# Patient Record
Sex: MALE | Race: WHITE
[De-identification: names, ages, dates, MRNs, and addresses within clinical notes are randomized per-mention and may not be internally consistent; named-entity substitution may affect disease eponyms.]

---

## 2022-04-11 ENCOUNTER — HOSPITAL ENCOUNTER (EMERGENCY)
Dept: HOSPITAL 4 - SED | Age: 57
Discharge: HOME | End: 2022-04-11
Payer: COMMERCIAL

## 2022-04-11 VITALS — HEIGHT: 70 IN | BODY MASS INDEX: 21.47 KG/M2 | WEIGHT: 150 LBS | SYSTOLIC BLOOD PRESSURE: 138 MMHG

## 2022-04-11 VITALS — SYSTOLIC BLOOD PRESSURE: 148 MMHG

## 2022-04-11 DIAGNOSIS — C71.9: ICD-10-CM

## 2022-04-11 DIAGNOSIS — Z20.822: ICD-10-CM

## 2022-04-11 DIAGNOSIS — G40.909: Primary | ICD-10-CM

## 2022-04-11 LAB
ALBUMIN SERPL BCP-MCNC: 3.7 G/DL (ref 3.4–4.8)
ALT SERPL W P-5'-P-CCNC: 94 U/L (ref 12–78)
ANION GAP SERPL CALCULATED.3IONS-SCNC: 10 MMOL/L (ref 5–15)
AST SERPL W P-5'-P-CCNC: 23 U/L (ref 10–37)
BASOPHILS # BLD AUTO: 0 K/UL (ref 0–0.2)
BASOPHILS NFR BLD AUTO: 0.2 % (ref 0–2)
BILIRUB SERPL-MCNC: 0.3 MG/DL (ref 0–1)
BUN SERPL-MCNC: 34 MG/DL (ref 8–21)
CALCIUM SERPL-MCNC: 8.8 MG/DL (ref 8.4–11)
CHLORIDE SERPL-SCNC: 101 MMOL/L (ref 98–107)
CREAT SERPL-MCNC: 1.32 MG/DL (ref 0.55–1.3)
EOSINOPHIL # BLD AUTO: 0 K/UL (ref 0–0.4)
EOSINOPHIL NFR BLD AUTO: 0 % (ref 0–4)
ERYTHROCYTE [DISTWIDTH] IN BLOOD BY AUTOMATED COUNT: 18.9 % (ref 9–15)
GFR SERPL CREATININE-BSD FRML MDRD: 71 ML/MIN (ref 90–?)
GLUCOSE SERPL-MCNC: 115 MG/DL (ref 70–99)
HCT VFR BLD AUTO: 36.8 % (ref 36–54)
HGB BLD-MCNC: 12 G/DL (ref 14–18)
LYMPHOCYTES # BLD AUTO: 0.3 K/UL (ref 1–5.5)
LYMPHOCYTES NFR BLD AUTO: 5.1 % (ref 20.5–51.5)
MCH RBC QN AUTO: 28 PG (ref 27–31)
MCHC RBC AUTO-ENTMCNC: 33 % (ref 32–36)
MCV RBC AUTO: 86 FL (ref 79–98)
MONOCYTES # BLD MANUAL: 0.4 K/UL (ref 0–1)
MONOCYTES # BLD MANUAL: 6.4 % (ref 1.7–9.3)
NEUTROPHILS # BLD AUTO: 5.4 K/UL (ref 1.8–7.7)
NEUTROPHILS NFR BLD AUTO: 88.3 % (ref 40–70)
PLATELET # BLD AUTO: 338 K/UL (ref 130–430)
POTASSIUM SERPL-SCNC: 4.5 MMOL/L (ref 3.5–5.1)
RBC # BLD AUTO: 4.29 MIL/UL (ref 4.2–6.2)
SODIUM SERPLBLD-SCNC: 138 MMOL/L (ref 136–145)
WBC # BLD AUTO: 6.1 K/UL (ref 4.8–10.8)

## 2022-04-11 PROCEDURE — 96365 THER/PROPH/DIAG IV INF INIT: CPT

## 2022-04-11 PROCEDURE — 76376 3D RENDER W/INTRP POSTPROCES: CPT

## 2022-04-11 PROCEDURE — 70450 CT HEAD/BRAIN W/O DYE: CPT

## 2022-04-11 PROCEDURE — 99284 EMERGENCY DEPT VISIT MOD MDM: CPT

## 2022-04-11 PROCEDURE — 87426 SARSCOV CORONAVIRUS AG IA: CPT

## 2022-04-11 PROCEDURE — 36415 COLL VENOUS BLD VENIPUNCTURE: CPT

## 2022-04-11 PROCEDURE — 80053 COMPREHEN METABOLIC PANEL: CPT

## 2022-04-11 PROCEDURE — 85025 COMPLETE CBC W/AUTO DIFF WBC: CPT

## 2022-09-09 ENCOUNTER — HOSPITAL ENCOUNTER (INPATIENT)
Dept: HOSPITAL 4 - SED | Age: 57
LOS: 2 days | Discharge: ANOTHER HOSPITAL | DRG: 54 | End: 2022-09-11
Attending: FAMILY MEDICINE | Admitting: FAMILY MEDICINE
Payer: COMMERCIAL

## 2022-09-09 VITALS — BODY MASS INDEX: 19.28 KG/M2 | HEIGHT: 69 IN | WEIGHT: 130.13 LBS

## 2022-09-09 VITALS — SYSTOLIC BLOOD PRESSURE: 162 MMHG

## 2022-09-09 DIAGNOSIS — Z79.899: ICD-10-CM

## 2022-09-09 DIAGNOSIS — D75.839: ICD-10-CM

## 2022-09-09 DIAGNOSIS — I69.354: ICD-10-CM

## 2022-09-09 DIAGNOSIS — C71.9: Primary | ICD-10-CM

## 2022-09-09 DIAGNOSIS — K21.9: ICD-10-CM

## 2022-09-09 DIAGNOSIS — R73.9: ICD-10-CM

## 2022-09-09 DIAGNOSIS — E88.09: ICD-10-CM

## 2022-09-09 DIAGNOSIS — G93.6: ICD-10-CM

## 2022-09-09 DIAGNOSIS — Z92.21: ICD-10-CM

## 2022-09-09 DIAGNOSIS — N50.89: ICD-10-CM

## 2022-09-09 DIAGNOSIS — I10: ICD-10-CM

## 2022-09-09 DIAGNOSIS — Z20.822: ICD-10-CM

## 2022-09-09 DIAGNOSIS — D64.9: ICD-10-CM

## 2022-09-09 DIAGNOSIS — Z85.841: ICD-10-CM

## 2022-09-09 DIAGNOSIS — G40.909: ICD-10-CM

## 2022-09-09 LAB
ALBUMIN SERPL BCP-MCNC: 3.2 G/DL (ref 3.4–4.8)
ALT SERPL W P-5'-P-CCNC: 12 U/L (ref 12–78)
ANION GAP SERPL CALCULATED.3IONS-SCNC: 9 MMOL/L (ref 5–15)
AST SERPL W P-5'-P-CCNC: 8 U/L (ref 10–37)
BASOPHILS # BLD AUTO: 0 K/UL (ref 0–0.2)
BASOPHILS NFR BLD AUTO: 0.3 % (ref 0–2)
BILIRUB SERPL-MCNC: 0.3 MG/DL (ref 0–1)
BUN SERPL-MCNC: 19 MG/DL (ref 8–21)
CALCIUM SERPL-MCNC: 9.7 MG/DL (ref 8.4–11)
CHLORIDE SERPL-SCNC: 100 MMOL/L (ref 98–107)
CREAT SERPL-MCNC: 0.95 MG/DL (ref 0.55–1.3)
EOSINOPHIL # BLD AUTO: 0.1 K/UL (ref 0–0.4)
EOSINOPHIL NFR BLD AUTO: 0.6 % (ref 0–4)
ERYTHROCYTE [DISTWIDTH] IN BLOOD BY AUTOMATED COUNT: 17 % (ref 9–15)
GFR SERPL CREATININE-BSD FRML MDRD: 104 ML/MIN (ref 90–?)
GLUCOSE SERPL-MCNC: 103 MG/DL (ref 70–99)
HCT VFR BLD AUTO: 30.9 % (ref 36–54)
HGB BLD-MCNC: 10.3 G/DL (ref 14–18)
LYMPHOCYTES # BLD AUTO: 0.5 K/UL (ref 1–5.5)
LYMPHOCYTES NFR BLD AUTO: 5.8 % (ref 20.5–51.5)
MCH RBC QN AUTO: 27 PG (ref 27–31)
MCHC RBC AUTO-ENTMCNC: 33 % (ref 32–36)
MCV RBC AUTO: 82 FL (ref 79–98)
MONOCYTES # BLD MANUAL: 0.9 K/UL (ref 0–1)
MONOCYTES # BLD MANUAL: 9.9 % (ref 1.7–9.3)
NEUTROPHILS # BLD AUTO: 7.4 K/UL (ref 1.8–7.7)
NEUTROPHILS NFR BLD AUTO: 83.4 % (ref 40–70)
PLATELET # BLD AUTO: 614 K/UL (ref 130–430)
POTASSIUM SERPL-SCNC: 3.7 MMOL/L (ref 3.5–5.1)
RBC # BLD AUTO: 3.79 MIL/UL (ref 4.2–6.2)
SODIUM SERPLBLD-SCNC: 139 MMOL/L (ref 136–145)
WBC # BLD AUTO: 8.8 K/UL (ref 4.8–10.8)

## 2022-09-09 RX ADMIN — DEXTROSE AND SODIUM CHLORIDE SCH MLS/HR: 5; 450 INJECTION, SOLUTION INTRAVENOUS at 21:21

## 2022-09-09 NOTE — NUR
BIBA WITH C/C OF GENERALIZED WEAKNESS X 2 DAYS. HX OF BRAIN CA, CURRENTLY ON 
CHEMO. PT IS POOR HISTORIAN. MED HX OF CA, HTN, GERD. AAO X 2-3. DOES NOT KNOW 
DATE OR TIME. PT REPORTS LAST CHEMO TREATMENT 10 DAYS AGO. PT ON YI WITH 
BLS CREW IN MAIN ED.

## 2022-09-09 NOTE — NUR
Recieved pt resting in bed alert and oriented x 4.has the fiance by bedside, 
vitals done are within normal limit.

## 2022-09-09 NOTE — NUR
Patient will be admitted to care of [facility].MILAN  Admitted to ICU unit.  Will 
go to room .  Belongings list completed.  Complete and up to date summary 
report printed. SBAR report to be given at bedside with opportunity for 
questions.

## 2022-09-09 NOTE — NUR
Placed in room 3  . Placed on cardiac monitor, blood pressure machine and pulse 
oximeter. To gown for exam. Side rails up.

Report given to ANNE-MARIE AMAARL.

## 2022-09-10 VITALS — SYSTOLIC BLOOD PRESSURE: 132 MMHG

## 2022-09-10 VITALS — SYSTOLIC BLOOD PRESSURE: 128 MMHG

## 2022-09-10 VITALS — SYSTOLIC BLOOD PRESSURE: 115 MMHG

## 2022-09-10 VITALS — SYSTOLIC BLOOD PRESSURE: 150 MMHG

## 2022-09-10 VITALS — SYSTOLIC BLOOD PRESSURE: 123 MMHG

## 2022-09-10 VITALS — SYSTOLIC BLOOD PRESSURE: 130 MMHG

## 2022-09-10 VITALS — SYSTOLIC BLOOD PRESSURE: 135 MMHG

## 2022-09-10 VITALS — SYSTOLIC BLOOD PRESSURE: 147 MMHG

## 2022-09-10 VITALS — SYSTOLIC BLOOD PRESSURE: 124 MMHG

## 2022-09-10 VITALS — SYSTOLIC BLOOD PRESSURE: 153 MMHG

## 2022-09-10 VITALS — SYSTOLIC BLOOD PRESSURE: 126 MMHG

## 2022-09-10 VITALS — SYSTOLIC BLOOD PRESSURE: 134 MMHG

## 2022-09-10 VITALS — SYSTOLIC BLOOD PRESSURE: 129 MMHG

## 2022-09-10 VITALS — SYSTOLIC BLOOD PRESSURE: 142 MMHG

## 2022-09-10 VITALS — SYSTOLIC BLOOD PRESSURE: 113 MMHG

## 2022-09-10 VITALS — SYSTOLIC BLOOD PRESSURE: 116 MMHG

## 2022-09-10 VITALS — SYSTOLIC BLOOD PRESSURE: 102 MMHG

## 2022-09-10 VITALS — SYSTOLIC BLOOD PRESSURE: 110 MMHG

## 2022-09-10 LAB
ALBUMIN SERPL BCP-MCNC: 2.7 G/DL (ref 3.4–4.8)
ALT SERPL W P-5'-P-CCNC: 15 U/L (ref 12–78)
ANION GAP SERPL CALCULATED.3IONS-SCNC: 8 MMOL/L (ref 5–15)
AST SERPL W P-5'-P-CCNC: 12 U/L (ref 10–37)
BASOPHILS # BLD AUTO: 0 K/UL (ref 0–0.2)
BASOPHILS NFR BLD AUTO: 0.2 % (ref 0–2)
BILIRUB SERPL-MCNC: 0.3 MG/DL (ref 0–1)
BUN SERPL-MCNC: 20 MG/DL (ref 8–21)
CALCIUM SERPL-MCNC: 9.1 MG/DL (ref 8.4–11)
CHLORIDE SERPL-SCNC: 102 MMOL/L (ref 98–107)
CREAT SERPL-MCNC: 0.94 MG/DL (ref 0.55–1.3)
EOSINOPHIL # BLD AUTO: 0 K/UL (ref 0–0.4)
EOSINOPHIL NFR BLD AUTO: 0 % (ref 0–4)
ERYTHROCYTE [DISTWIDTH] IN BLOOD BY AUTOMATED COUNT: 16.7 % (ref 9–15)
GFR SERPL CREATININE-BSD FRML MDRD: 106 ML/MIN (ref 90–?)
GLUCOSE SERPL-MCNC: 151 MG/DL (ref 70–99)
HCT VFR BLD AUTO: 28.1 % (ref 36–54)
LYMPHOCYTES # BLD AUTO: 0.3 K/UL (ref 1–5.5)
LYMPHOCYTES NFR BLD AUTO: 5.5 % (ref 20.5–51.5)
MCV RBC AUTO: 81 FL (ref 79–98)
MONOCYTES # BLD MANUAL: 0.4 K/UL (ref 0–1)
MONOCYTES # BLD MANUAL: 6.8 % (ref 1.7–9.3)
NEUTROPHILS # BLD AUTO: 5.5 K/UL (ref 1.8–7.7)
NEUTROPHILS NFR BLD AUTO: 87.5 % (ref 40–70)
PLATELET # BLD AUTO: 525 K/UL (ref 130–430)
POTASSIUM SERPL-SCNC: 4 MMOL/L (ref 3.5–5.1)
RBC # BLD AUTO: 3.49 MIL/UL (ref 4.2–6.2)
SODIUM SERPLBLD-SCNC: 138 MMOL/L (ref 136–145)
WBC # BLD AUTO: 6.3 K/UL (ref 4.8–10.8)

## 2022-09-10 RX ADMIN — DEXAMETHASONE SODIUM PHOSPHATE SCH MG: 4 INJECTION, SOLUTION INTRAMUSCULAR; INTRAVENOUS at 12:52

## 2022-09-10 RX ADMIN — DEXAMETHASONE SODIUM PHOSPHATE SCH MG: 4 INJECTION, SOLUTION INTRAMUSCULAR; INTRAVENOUS at 23:59

## 2022-09-10 RX ADMIN — DEXAMETHASONE SODIUM PHOSPHATE SCH MG: 4 INJECTION, SOLUTION INTRAMUSCULAR; INTRAVENOUS at 17:51

## 2022-09-10 RX ADMIN — DEXTROSE AND SODIUM CHLORIDE SCH MLS/HR: 5; 450 INJECTION, SOLUTION INTRAVENOUS at 17:51

## 2022-09-10 RX ADMIN — DEXTROSE AND SODIUM CHLORIDE SCH MLS/HR: 5; 450 INJECTION, SOLUTION INTRAVENOUS at 02:33

## 2022-09-10 NOTE — NUR
Report received from night shift RN for continuity of care. Patient in stable condition. No 
distress noted. Patient resting. Vital signs stable.

## 2022-09-10 NOTE — NUR
RECEIVED REPORT FROM JACKIE XIE, PATIENT ALERT AND ORIENTED SITTING UP IN BED TALKING WITH 
HIS THREE CHILDREN.  VITAL SIGNS STABLE, NO COMPLAINTS OF PAIN OR DISCOMFORT AT THIS TIME.  
WILL CONTINUE TO MONITOR AND REPORT CHANGES AS NEEDED.  EMMA RN.

## 2022-09-10 NOTE — NUR
Dr. Reyes made rounds to see patient. Patient requested food and drink. New orders noted 
and carried out.

## 2022-09-10 NOTE — NUR
OPENING NOTES:

SILVIA FROM ER BROUGHT PATIENT TO ROOM 127. PATIENT IS CONFUSED AND UNABLE TO GIVE NAME OR 
DATE OF BIRTH. PATIENT IS ON REGULAR DIET, ABLE TO EAT AND DRINK BY HIMSELF WITH ASSISTANCE 
ONLY TO OPEN ITEMS. ON ROOM AIR STATING %, NO SKIN ISSUES, LEFT SIDED PARALYSIS FROM A 
STROKE SEVERAL YEARS AGO. PATIENT IS ABLE TO URINATE ON HIS ON IN A URINAL WITH ASSISTANCE. 
BRAKES ARE LOCKED, OXYGEN AND SUCTION WORKING, BED AT THE LOWEST LEVEL, 3 SIDE RAILS UP, AND 
CALL LIGHT WITHIN REACH.

## 2022-09-11 VITALS — SYSTOLIC BLOOD PRESSURE: 126 MMHG

## 2022-09-11 VITALS — SYSTOLIC BLOOD PRESSURE: 115 MMHG

## 2022-09-11 VITALS — SYSTOLIC BLOOD PRESSURE: 101 MMHG

## 2022-09-11 VITALS — SYSTOLIC BLOOD PRESSURE: 106 MMHG

## 2022-09-11 VITALS — SYSTOLIC BLOOD PRESSURE: 11 MMHG

## 2022-09-11 VITALS — SYSTOLIC BLOOD PRESSURE: 105 MMHG

## 2022-09-11 VITALS — SYSTOLIC BLOOD PRESSURE: 113 MMHG

## 2022-09-11 VITALS — SYSTOLIC BLOOD PRESSURE: 120 MMHG

## 2022-09-11 VITALS — SYSTOLIC BLOOD PRESSURE: 50 MMHG

## 2022-09-11 VITALS — SYSTOLIC BLOOD PRESSURE: 103 MMHG

## 2022-09-11 VITALS — SYSTOLIC BLOOD PRESSURE: 129 MMHG

## 2022-09-11 LAB
ANION GAP SERPL CALCULATED.3IONS-SCNC: 7 MMOL/L (ref 5–15)
BASOPHILS # BLD AUTO: 0 K/UL (ref 0–0.2)
BASOPHILS NFR BLD AUTO: 0.1 % (ref 0–2)
BUN SERPL-MCNC: 20 MG/DL (ref 8–21)
CALCIUM SERPL-MCNC: 8.9 MG/DL (ref 8.4–11)
CHLORIDE SERPL-SCNC: 105 MMOL/L (ref 98–107)
CREAT SERPL-MCNC: 1.02 MG/DL (ref 0.55–1.3)
EOSINOPHIL # BLD AUTO: 0 K/UL (ref 0–0.4)
EOSINOPHIL NFR BLD AUTO: 0 % (ref 0–4)
ERYTHROCYTE [DISTWIDTH] IN BLOOD BY AUTOMATED COUNT: 17 % (ref 9–15)
GFR SERPL CREATININE-BSD FRML MDRD: 96 ML/MIN (ref 90–?)
GLUCOSE SERPL-MCNC: 158 MG/DL (ref 70–99)
HCT VFR BLD AUTO: 26.3 % (ref 36–54)
LYMPHOCYTES # BLD AUTO: 0.4 K/UL (ref 1–5.5)
LYMPHOCYTES NFR BLD AUTO: 5.7 % (ref 20.5–51.5)
MCV RBC AUTO: 81 FL (ref 79–98)
MONOCYTES # BLD MANUAL: 0.5 K/UL (ref 0–1)
MONOCYTES # BLD MANUAL: 7.1 % (ref 1.7–9.3)
NEUTROPHILS # BLD AUTO: 5.8 K/UL (ref 1.8–7.7)
NEUTROPHILS NFR BLD AUTO: 87.1 % (ref 40–70)
PLATELET # BLD AUTO: 521 K/UL (ref 130–430)
POTASSIUM SERPL-SCNC: 4.1 MMOL/L (ref 3.5–5.1)
RBC # BLD AUTO: 3.25 MIL/UL (ref 4.2–6.2)
SODIUM SERPLBLD-SCNC: 139 MMOL/L (ref 136–145)
WBC # BLD AUTO: 6.7 K/UL (ref 4.8–10.8)

## 2022-09-11 RX ADMIN — DEXAMETHASONE SODIUM PHOSPHATE SCH MG: 4 INJECTION, SOLUTION INTRAMUSCULAR; INTRAVENOUS at 06:08

## 2022-09-11 RX ADMIN — DEXTROSE AND SODIUM CHLORIDE SCH MLS/HR: 5; 450 INJECTION, SOLUTION INTRAVENOUS at 02:08

## 2022-09-11 NOTE — NUR
[facility] to get a room in [facility] , [address] Bed2. Will give 
nuring report now. Will call brother Braden Figueroa about the transfer

## 2022-09-11 NOTE — NUR
transfferred per [facility] on Room air, vitals stable, alert and oriented times three 
per [place]. Mother was informed

## 2022-09-11 NOTE — NUR
PATIENT HAS BEEN BRADYCARDIC SINCE MIDNIGHT ASSESSMENT, PATIENT SOUND ASLEEP NO SIGNS OR 
SYMPTOMS OF DISTRESS OR ALTERATION IN VITAL SIGNS TO SIGNIFY AN ISSUE THAT NEEDS ADDRESSED.  
PATIENT WOKE UP AT 0340 AND WAS ALERT AND ORIENTED AS PRIOR SHIFT ASSESSMENT AND HAD NO 
COMPLAINTS. WILL CONTINUE TO MONITOR AND ASSESS AS NECESSARY. EMMA RN

## 2022-09-11 NOTE — NUR
at 1015 a woman named Hermelinda(girlfriend ?) called that pt was having seizures from ICU 9 
room 127A but with our camera in the nursing ICU station - there was no seizures activity 
this time and went to the room immediately and prooven there was no seizures activity right 
now. When Hermelinda was in the room at 1025 am and family like mother and brother was there, 
they have some verbal arguements and charge nurse and security intervened. pt. was upset and 
HR was 130 due to the arguements bet the girlfriend Hermelinda and the pts. family so ativan 1 
mg IVP given